# Patient Record
Sex: MALE | Race: BLACK OR AFRICAN AMERICAN | NOT HISPANIC OR LATINO | Employment: UNEMPLOYED | URBAN - METROPOLITAN AREA
[De-identification: names, ages, dates, MRNs, and addresses within clinical notes are randomized per-mention and may not be internally consistent; named-entity substitution may affect disease eponyms.]

---

## 2017-10-22 ENCOUNTER — HOSPITAL ENCOUNTER (INPATIENT)
Facility: HOSPITAL | Age: 22
LOS: 7 days | Discharge: HOME | DRG: 885 | End: 2017-10-29
Attending: PSYCHIATRY & NEUROLOGY | Admitting: PSYCHIATRY & NEUROLOGY
Payer: MEDICAID

## 2017-10-22 DIAGNOSIS — F25.0 SCHIZOAFFECTIVE DISORDER, BIPOLAR TYPE: Primary | ICD-10-CM

## 2017-10-22 DIAGNOSIS — F29 PSYCHOSIS: ICD-10-CM

## 2017-10-22 PROCEDURE — 25000003 PHARM REV CODE 250: Performed by: PSYCHIATRY & NEUROLOGY

## 2017-10-22 PROCEDURE — 11400000 HC PSYCH PRIVATE ROOM

## 2017-10-22 RX ORDER — IBUPROFEN 200 MG
1 TABLET ORAL DAILY PRN
Status: DISCONTINUED | OUTPATIENT
Start: 2017-10-22 | End: 2017-10-29 | Stop reason: HOSPADM

## 2017-10-22 RX ORDER — OLANZAPINE 10 MG/2ML
10 INJECTION, POWDER, FOR SOLUTION INTRAMUSCULAR EVERY 4 HOURS PRN
Status: DISCONTINUED | OUTPATIENT
Start: 2017-10-22 | End: 2017-10-29 | Stop reason: HOSPADM

## 2017-10-22 RX ORDER — ACETAMINOPHEN 325 MG/1
650 TABLET ORAL EVERY 6 HOURS PRN
Status: DISCONTINUED | OUTPATIENT
Start: 2017-10-22 | End: 2017-10-29 | Stop reason: HOSPADM

## 2017-10-22 RX ORDER — FOLIC ACID 1 MG/1
1 TABLET ORAL DAILY
Status: DISCONTINUED | OUTPATIENT
Start: 2017-10-23 | End: 2017-10-29 | Stop reason: HOSPADM

## 2017-10-22 RX ORDER — LOPERAMIDE HYDROCHLORIDE 2 MG/1
2 CAPSULE ORAL
Status: DISCONTINUED | OUTPATIENT
Start: 2017-10-22 | End: 2017-10-29 | Stop reason: HOSPADM

## 2017-10-22 RX ORDER — OLANZAPINE 10 MG/1
10 TABLET ORAL EVERY 4 HOURS PRN
Status: DISCONTINUED | OUTPATIENT
Start: 2017-10-22 | End: 2017-10-29 | Stop reason: HOSPADM

## 2017-10-22 RX ORDER — HYDROXYZINE PAMOATE 50 MG/1
50 CAPSULE ORAL NIGHTLY PRN
Status: DISCONTINUED | OUTPATIENT
Start: 2017-10-22 | End: 2017-10-29 | Stop reason: HOSPADM

## 2017-10-22 RX ORDER — MAG HYDROX/ALUMINUM HYD/SIMETH 200-200-20
30 SUSPENSION, ORAL (FINAL DOSE FORM) ORAL EVERY 6 HOURS PRN
Status: DISCONTINUED | OUTPATIENT
Start: 2017-10-22 | End: 2017-10-29 | Stop reason: HOSPADM

## 2017-10-22 RX ORDER — DOCUSATE SODIUM 100 MG/1
100 CAPSULE, LIQUID FILLED ORAL DAILY PRN
Status: DISCONTINUED | OUTPATIENT
Start: 2017-10-22 | End: 2017-10-29 | Stop reason: HOSPADM

## 2017-10-22 RX ADMIN — HYDROXYZINE PAMOATE 50 MG: 50 CAPSULE ORAL at 11:10

## 2017-10-22 NOTE — PSYCH
Pt accepted for admission by Dr Michelle.Report received from Ayesha OWEN.Pt arrived on unit at 1410.Routine body assessment/contraband search performed.No contraband found.Pt brought in to ER last night by police on a OPC.Pt mom placed the OPC on pt.Pt denies this person is his mom.OPC states pt has been acting bizarre and made threat to kill his mom and step father.Pt reports he does not know why he is here.Pt admits he has been admitted at least twice before at [facility].Pt states he was told he was schizophrenic but he states he is not.Pt reports hx of being put on Risperdal but he refuses to take it because he does not need it.Pt admits to seeing ghost of dead people''alot''.Pt distracted during interview and questions needed to be repeated frequently.Pt focused on getting out and guarded.Pt given unit tour and educated on program and rules.Pt anxious and distracted and pacing at times.

## 2017-10-23 LAB
CHOLEST SERPL-MCNC: 142 MG/DL
CHOLEST/HDLC SERPL: 3.8 {RATIO}
GLUCOSE SERPL-MCNC: 90 MG/DL
HDLC SERPL-MCNC: 37 MG/DL
HDLC SERPL: 26.1 %
LDLC SERPL CALC-MCNC: 92.8 MG/DL
NONHDLC SERPL-MCNC: 105 MG/DL
TRIGL SERPL-MCNC: 61 MG/DL

## 2017-10-23 PROCEDURE — 80061 LIPID PANEL: CPT

## 2017-10-23 PROCEDURE — 25000003 PHARM REV CODE 250: Performed by: PSYCHIATRY & NEUROLOGY

## 2017-10-23 PROCEDURE — 99223 1ST HOSP IP/OBS HIGH 75: CPT | Mod: ,,, | Performed by: PSYCHIATRY & NEUROLOGY

## 2017-10-23 PROCEDURE — 11400000 HC PSYCH PRIVATE ROOM

## 2017-10-23 PROCEDURE — 82947 ASSAY GLUCOSE BLOOD QUANT: CPT

## 2017-10-23 PROCEDURE — 36415 COLL VENOUS BLD VENIPUNCTURE: CPT

## 2017-10-23 PROCEDURE — 99232 SBSQ HOSP IP/OBS MODERATE 35: CPT | Mod: ,,, | Performed by: FAMILY MEDICINE

## 2017-10-23 RX ORDER — OLANZAPINE 10 MG/1
10 TABLET, ORALLY DISINTEGRATING ORAL ONCE
Status: COMPLETED | OUTPATIENT
Start: 2017-10-23 | End: 2017-10-23

## 2017-10-23 RX ORDER — DIAZEPAM 10 MG/1
10 TABLET ORAL 2 TIMES DAILY
Status: DISCONTINUED | OUTPATIENT
Start: 2017-10-23 | End: 2017-10-25

## 2017-10-23 RX ORDER — OLANZAPINE 10 MG/1
10 TABLET, ORALLY DISINTEGRATING ORAL NIGHTLY
Status: DISCONTINUED | OUTPATIENT
Start: 2017-10-23 | End: 2017-10-25

## 2017-10-23 RX ORDER — OLANZAPINE 5 MG/1
5 TABLET, ORALLY DISINTEGRATING ORAL DAILY
Status: DISCONTINUED | OUTPATIENT
Start: 2017-10-24 | End: 2017-10-29 | Stop reason: HOSPADM

## 2017-10-23 RX ADMIN — DIAZEPAM 10 MG: 10 TABLET ORAL at 08:10

## 2017-10-23 RX ADMIN — OLANZAPINE 10 MG: 10 TABLET, ORALLY DISINTEGRATING ORAL at 08:10

## 2017-10-23 RX ADMIN — DIAZEPAM 10 MG: 10 TABLET ORAL at 09:10

## 2017-10-23 RX ADMIN — HYDROXYZINE PAMOATE 50 MG: 50 CAPSULE ORAL at 09:10

## 2017-10-23 RX ADMIN — FOLIC ACID 1 MG: 1 TABLET ORAL at 08:10

## 2017-10-23 RX ADMIN — THERA TABS 1 TABLET: TAB at 08:10

## 2017-10-23 RX ADMIN — OLANZAPINE 10 MG: 10 TABLET, ORALLY DISINTEGRATING ORAL at 09:10

## 2017-10-23 NOTE — CONSULTS
History & Physical      SUBJECTIVE:     History of Present Illness:  Patient is a 22 y.o. male presents with substance abuse, psychosis and scaring the family. Brought in by OPC.  Admitted to [facility].    No past medical history other than psych. No complaints today.    No prescriptions prior to admission.       Review of patient's allergies indicates:  No Known Allergies    Past Medical History:   Diagnosis Date    History of psychiatric hospitalization     Hx of psychiatric care     Psychiatric problem     Therapy      History reviewed. No pertinent surgical history.  History reviewed. No pertinent family history.  Social History   Substance Use Topics    Smoking status: Current Every Day Smoker     Years: 3.00     Types: Cigarettes    Smokeless tobacco: Never Used    Alcohol use Not on file        Review of Systems:  Constitutional: no fever or chills  Respiratory: no cough or shorness of breath  Cardiovascular: no chest pain or palpitations  Gastrointestinal: no nausea or vomiting, no abdominal pain or change in bowel habits  Musculoskeletal: no arthralgias or myalgias    OBJECTIVE:     Vital Signs (Most Recent)  Temp: 97.9 °F (36.6 °C) (10/23/17 0807)  Pulse: 83 (10/23/17 0807)  Resp: 18 (10/23/17 0807)  BP: 115/75 (10/23/17 0807)    Physical Exam:  General: well developed, well nourished  Lungs:  clear to auscultation bilaterally and normal respiratory effort  Cardiovascular: Heart: regular rate and rhythm, S1, S2 normal, no murmur, click, rub or gallop. Chest Wall: no tenderness. Extremities: no cyanosis or edema, or clubbing. Pulses: 2+ and symmetric.  Abdomen/Rectal: Abdomen: soft, non-tender non-distented; bowel sounds normal; no masses,  no organomegaly. Rectal: not examined  Skin: Skin color, texture, turgor normal. No rashes or lesions  Musculoskeletal:normal gait  Psych:   Neuro: Cranial nerves:  CN II Visual fields full to confrontation.   CN III, IV, VI Pupils are equal, round, and reactive to  light.  CN III: no palsy  Nystagmus: none   Diplopia: none  Ophthalmoparesis: none  CN V Facial sensation intact.   CN VII Facial expression full, symmetric.   CN VIII normal.   CN IX normal.   CN X normal.   CN XI normal.   CN XII normal.      Laboratory  CBC:   Recent Labs  Lab 10/21/17  2252   WBC 9.22   RBC 5.24   HGB 15.8   HCT 45.1      MCV 86   MCH 30.2   MCHC 35.0     CMP:   Recent Labs  Lab 10/21/17  2252      CALCIUM 9.9   ALBUMIN 4.5   PROT 8.3      K 4.3   CO2 18*      BUN 24*   CREATININE 1.2   ALKPHOS 88   ALT 26   AST 65*   BILITOT 0.6       Recent Labs  Lab 10/21/17  2310   COLORU Yellow   SPECGRAV 1.025   PHUR 6.0   PROTEINUA Negative   NITRITE Negative   LEUKOCYTESUR Negative   UROBILINOGEN Negative     TSH   Date Value Ref Range Status   10/21/2017 0.365 (L) 0.400 - 4.000 uIU/mL Final     No results found for this or any previous visit.  Alcohol, Medical, Serum   Date Value Ref Range Status   10/21/2017 <10 <10 mg/dL Final     Acetaminophen (Tylenol), Serum   Date Value Ref Range Status   10/21/2017 <3.0 (L) 10.0 - 20.0 ug/mL Final     Comment:     Toxic Levels:  Adults (4 hr post-ingestion).........>150 ug/mL  Adults (12 hr post-ingestion)........>40 ug/mL  Peds (2 hr post-ingestion, liquid)...>225 ug/mL       No results found for this or any previous visit.  Results for orders placed or performed during the hospital encounter of 10/21/17   Drug screen panel, emergency   Result Value Ref Range    Benzodiazepines Negative     Methadone metabolites Negative     Cocaine (Metab.) Presumptive Positive     Opiate Scrn, Ur Negative     Barbiturate Screen, Ur Negative     Amphetamine Screen, Ur Presumptive Positive     THC Presumptive Positive     Phencyclidine Negative     Creatinine, Random Ur 215.3 23.0 - 375.0 mg/dL    Toxicology Information SEE COMMENT      No results found for: PREGTESTUR    ASSESSMENT/PLAN:     Active Hospital Problems    Diagnosis  POA    Psychosis  [F29]  Yes      Resolved Hospital Problems    Diagnosis Date Resolved POA   No resolved problems to display.       Plan: Further orders for psych
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
## 2017-10-23 NOTE — NURSING
Patient resting quietly in bed with eyes closed.  Respirations easy and unlabored appears asleep.  Slept well for 8 hours.  Safety maintained with rounds every 15 minutes. Bed at lowest position.  Path ways kept clear.  No fall occured .

## 2017-10-23 NOTE — PLAN OF CARE
Problem: Patient Care Overview (Adult)  Goal: Plan of Care Review  Outcome: Ongoing (interventions implemented as appropriate)  Plan of care reviewed with patient, patient not interested in care plan.  Paces on unit, mumbles often. Minimizes substance abuse issues. Accepted Valium and Olanzepine Zydus for agitation earlier, slept for one hour afterwards.  Accepts meals and snacks.  Clutter-free environment and clear pathways maintained.  Promoted an individualized safety plan, effective coping strategies, a drug-free lifestyle, and and reality-based interactions.  Will continue to monitor for safety.

## 2017-10-23 NOTE — PLAN OF CARE
"  Treatment Recommendation:   1:1 Intervention (as needed)    Reality Orientation Skilled Activity  Cognitive Stimulation Skilled Activity  Creative Expression Skilled Activity  Mild Exercises Skilled Activity  Stress Management Skilled Activity  Coping Skilled Activity  Leisure Education and Awareness Skilled Activity    Treatment Goal(s):  Long Term Goals Refer To Master Treatment Plan    Short Term Treatment Goal(s)  Patient Will:  Exhibit Improvement in Mood  Demonstrate Improvement in Thought Processes / Awareness  Demonstrate Constructive Expression of Feelings and Behavior  Identify (+) Leisure / Recreation Activities that Promote Wellness and Promote a Sober Lifestyle    Discharge Recommendations:  Encourage Patient to Actively Utilize Available Community Resources to Increase Leisure Involvement to Decrease Signs and Symptoms of Illness  AA/NA Meetings  Follow Up with After Care Appointments  Continue with Current Leisure Activities     Patient presents with agitated, rambling affect and angry mood. Patient was OPC due to psychotic behavior. Patient states his reason for admit is due to "I was raised by someone with a weird sick obsession. Ain't nothing wrong with me. Somebody called the police and told a lie. I don't need no shots or nothing." Patient is upset about being in the hospital. He states his nerves are bad and he would be walking around. Patient was observed pulling on the doors trying to escape and hanging around the doors. Patient admits to negative leisure life style of cocaine, marijuana, tobacco, history of heroin, crystal meth, ecstasy. Patient reports that he is single, has no children, is unemployed, high school education, lived with his mother before admit(can't go back to his mother). When asked about a goal, patient states "I just want to get out of here."   "
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
## 2017-10-23 NOTE — PLAN OF CARE
Problem: Overarching Goals (Adult)  Goal: Develops/Participates in Therapeutic [place] to Support Successful Transition  Patient refused to attend Psychotherapy Group despite staff encouragement. Patient isolating in room in bed. Will continue to encourage patient participation in group. Will meet  1:1 with patient later

## 2017-10-23 NOTE — NURSING
Patient quietly resting in his room this evening.  He walked to activity room for PM snack, and went right back to his room and layed down.  At 2300, patient came out of his room stating that he needed something to help him go back to sleep because he slept most of the day.  Vistaril  50mg given for insomnia.  He denies depression, SI and HI or plan.  He is calm and cooperative with staff.

## 2017-10-24 PROCEDURE — 11400000 HC PSYCH PRIVATE ROOM

## 2017-10-24 PROCEDURE — 99233 SBSQ HOSP IP/OBS HIGH 50: CPT | Mod: ,,, | Performed by: PSYCHIATRY & NEUROLOGY

## 2017-10-24 PROCEDURE — 25000003 PHARM REV CODE 250: Performed by: PSYCHIATRY & NEUROLOGY

## 2017-10-24 RX ADMIN — THERA TABS 1 TABLET: TAB at 08:10

## 2017-10-24 RX ADMIN — DIAZEPAM 10 MG: 10 TABLET ORAL at 08:10

## 2017-10-24 RX ADMIN — OLANZAPINE 5 MG: 5 TABLET, ORALLY DISINTEGRATING ORAL at 08:10

## 2017-10-24 RX ADMIN — HYDROXYZINE PAMOATE 50 MG: 50 CAPSULE ORAL at 05:10

## 2017-10-24 RX ADMIN — FOLIC ACID 1 MG: 1 TABLET ORAL at 08:10

## 2017-10-24 RX ADMIN — OLANZAPINE 10 MG: 10 TABLET, ORALLY DISINTEGRATING ORAL at 08:10

## 2017-10-24 NOTE — PLAN OF CARE
Problem: Patient Care Overview (Adult)  Goal: Plan of Care Review  Outcome: Ongoing (interventions implemented as appropriate)  Plan of care reviewed with patient, patient agrees with plan. Denies suicidal ideations and homicidal ideations.  Accepts meals and snacks, complaint with some of his ordered medications.  Paces on unit, gait steady, no falls.  Interacts with other peers.  Promoted an individualized safety plan, effective coping strategies, a drug-free lifestyle, and reality-based interactions.  Will continue to monitor for safety.
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
## 2017-10-24 NOTE — PLAN OF CARE
Problem: Patient Care Overview (Adult)  Goal: Plan of Care Review  Outcome: Ongoing (interventions implemented as appropriate)  Pt demanding, intrusive, med seeking, med compliant, appetite good, safety precautions maitained, will continue to monitor

## 2017-10-24 NOTE — PLAN OF CARE
Problem: Patient Care Overview (Adult)  Goal: Discharge Needs Assessment  Patient [facility] shelter [facility]. Patient is not interested in following up with a mental health but will have an appointment. Patient will need transportation.

## 2017-10-24 NOTE — PLAN OF CARE
Problem: Overarching Goals (Adult)  Goal: Develops/Participates in Therapeutic [place] to Support Successful Transition  Patient did not attend Psychotherapy Group. Patient went to bed and did not get up for group. Will continue to encourage patient participation in group.

## 2017-10-24 NOTE — PLAN OF CARE
Problem: Patient Care Overview (Adult)  Goal: Plan of Care Review  Outcome: Ongoing (interventions implemented as appropriate)  Pt is awake resting in bed at this time and has slept 8.5 hours uninterupted.  Had got out room around 0430 walking hallway.  No outbursts, no needs voiced.  NAD.  Resp even & unlabored.  Pathways clear.  Q 15 minute safety checks ongoing.  All precautions maintained

## 2017-10-25 PROCEDURE — 11400000 HC PSYCH PRIVATE ROOM

## 2017-10-25 PROCEDURE — 99233 SBSQ HOSP IP/OBS HIGH 50: CPT | Mod: ,,, | Performed by: PSYCHIATRY & NEUROLOGY

## 2017-10-25 PROCEDURE — 25000003 PHARM REV CODE 250: Performed by: PSYCHIATRY & NEUROLOGY

## 2017-10-25 RX ORDER — DIVALPROEX SODIUM 500 MG/1
1000 TABLET, FILM COATED, EXTENDED RELEASE ORAL NIGHTLY
Status: DISCONTINUED | OUTPATIENT
Start: 2017-10-25 | End: 2017-10-29 | Stop reason: HOSPADM

## 2017-10-25 RX ORDER — DIVALPROEX SODIUM 500 MG/1
500 TABLET, FILM COATED, EXTENDED RELEASE ORAL DAILY
Status: DISCONTINUED | OUTPATIENT
Start: 2017-10-25 | End: 2017-10-29 | Stop reason: HOSPADM

## 2017-10-25 RX ADMIN — OLANZAPINE 5 MG: 5 TABLET, ORALLY DISINTEGRATING ORAL at 08:10

## 2017-10-25 RX ADMIN — FOLIC ACID 1 MG: 1 TABLET ORAL at 08:10

## 2017-10-25 RX ADMIN — DIVALPROEX SODIUM 1000 MG: 500 TABLET, EXTENDED RELEASE ORAL at 08:10

## 2017-10-25 RX ADMIN — DIAZEPAM 10 MG: 10 TABLET ORAL at 08:10

## 2017-10-25 RX ADMIN — DIVALPROEX SODIUM 500 MG: 500 TABLET, EXTENDED RELEASE ORAL at 09:10

## 2017-10-25 RX ADMIN — OLANZAPINE 15 MG: 5 TABLET, ORALLY DISINTEGRATING ORAL at 08:10

## 2017-10-25 RX ADMIN — THERA TABS 1 TABLET: TAB at 08:10

## 2017-10-25 RX ADMIN — OLANZAPINE 10 MG: 10 TABLET, FILM COATED ORAL at 10:10

## 2017-10-25 RX ADMIN — HYDROXYZINE PAMOATE 50 MG: 50 CAPSULE ORAL at 08:10

## 2017-10-25 NOTE — PLAN OF CARE
Problem: Overarching Goals (Adult)  Goal: Develops/Participates in Therapeutic [place] to Support Successful Transition      Group Note    Behavior:  Patient attended psychotherapy group but did not stay and did not participate. Patient presents with irritable mood and guarded affect.     Intervention:  Knowing Your relapse warning signs and trigger - Patients completed handout identifying and recognizing early signs of setbacks and identifying triggers. Patients then discussed actions to take to reduce setbacks.     Response:  Patient wandered in and out of the session, but did not participate in the discussion or even use the handout.     Plan:  Will continue to encourage patient participation in group.

## 2017-10-25 NOTE — PLAN OF CARE
Problem: Patient Care Overview (Adult)  Goal: Plan of Care Review  Outcome: Ongoing (interventions implemented as appropriate)  Pt loud angry,agitated,and making threats to attack the doctor.Pt disruptive to milieu.Pt does not want to take medication and wants discharge.Pt did agree to take prn Zyprexa 10mg after much encouragement.Pt with constant pacing in coates.
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
## 2017-10-25 NOTE — PLAN OF CARE
Problem: Patient Care Overview (Adult)  Goal: Plan of Care Review  Outcome: Ongoing (interventions implemented as appropriate)  Plan of care reviewed.  Denies intent to harm self or others at this time.  Accepts all meals and medications.  Gait steady, no falls. Pacing in coates and will interact with staff and peers but can be intrusive but easily redirected.  Promoted an individualized safety plan, reality-based interactions, effective coping strategies, and impulse control.  Will continue to monitor for safety.

## 2017-10-25 NOTE — PLAN OF CARE
Problem: Patient Care Overview (Adult)  Goal: Plan of Care Review  Outcome: Ongoing (interventions implemented as appropriate)  Lying quiet in bed, eyes closed, respiration even and unlabored, appearing asleep.  Slept well most all shift except a couple of awakenings at the end.  Currently lying quiet in bed but adjusting his position.  Safety and precautions maintained with rounds every 15 minutes, bed is fixed in a low position and pathways kept clear.  No fall occurred.

## 2017-10-26 PROBLEM — F25.0 SCHIZOAFFECTIVE DISORDER, BIPOLAR TYPE: Status: ACTIVE | Noted: 2017-10-22

## 2017-10-26 PROBLEM — F14.20 COCAINE USE DISORDER, SEVERE, DEPENDENCE: Status: ACTIVE | Noted: 2017-10-26

## 2017-10-26 PROBLEM — F14.90 COCAINE USE: Status: ACTIVE | Noted: 2017-10-26

## 2017-10-26 PROBLEM — F15.20 AMPHETAMINE USE DISORDER, SEVERE, DEPENDENCE: Status: ACTIVE | Noted: 2017-10-26

## 2017-10-26 PROCEDURE — 99233 SBSQ HOSP IP/OBS HIGH 50: CPT | Mod: ,,, | Performed by: PSYCHIATRY & NEUROLOGY

## 2017-10-26 PROCEDURE — 25000003 PHARM REV CODE 250: Performed by: PSYCHIATRY & NEUROLOGY

## 2017-10-26 PROCEDURE — 11400000 HC PSYCH PRIVATE ROOM

## 2017-10-26 RX ADMIN — OLANZAPINE 15 MG: 5 TABLET, ORALLY DISINTEGRATING ORAL at 08:10

## 2017-10-26 RX ADMIN — FOLIC ACID 1 MG: 1 TABLET ORAL at 08:10

## 2017-10-26 RX ADMIN — THERA TABS 1 TABLET: TAB at 08:10

## 2017-10-26 RX ADMIN — OLANZAPINE 5 MG: 5 TABLET, ORALLY DISINTEGRATING ORAL at 08:10

## 2017-10-26 RX ADMIN — DIVALPROEX SODIUM 1000 MG: 500 TABLET, EXTENDED RELEASE ORAL at 08:10

## 2017-10-26 RX ADMIN — DIVALPROEX SODIUM 500 MG: 500 TABLET, EXTENDED RELEASE ORAL at 08:10

## 2017-10-26 NOTE — PLAN OF CARE
Problem: Patient Care Overview (Adult)  Goal: Plan of Care Review  Outcome: Ongoing (interventions implemented as appropriate)  Pt out on unit.Pt constantly pacing halls.Appitite good and grooming adequate.Pt with no angry outbursts.Talking in normal tone with less foul language.Medication compliant.

## 2017-10-26 NOTE — PLAN OF CARE
Problem: Overarching Goals (Adult)  Goal: Develops/Participates in Therapeutic [place] to Support Successful Transition    Intervention: Foster Therapeutic [place]  Group Note    Behavior:  Patient attended Psychotherapy Group and participated. Patient presented with guarded mood and affect.     Intervention:  Understanding and Coping with Stress - shared definitions of stress, good stress vs unhealthy, recognizing changes in your body, thinking, emotions and actions. Reviewed 52 Proven Stress Reducers. Patients chose 5 they could do and one they thought they could not do and discussed.     Response:  Patient was quiet at first, but attentive and shared his choices. Patient states yoga, aoiding people and places that don't fit his needs, learning to live one day at a time, and having an optimistic point of view are stress reducers he can do. Patient noted that forgiving people would be hard for him. Patient complained that he is here based on someone else's word and he is trying to make the doctor understand that. Discussed self care and learning to let go.       Plan:  Will continue to encourage patient participation in group.
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
## 2017-10-27 PROCEDURE — 99233 SBSQ HOSP IP/OBS HIGH 50: CPT | Mod: ,,, | Performed by: PSYCHIATRY & NEUROLOGY

## 2017-10-27 PROCEDURE — 25000003 PHARM REV CODE 250: Performed by: PSYCHIATRY & NEUROLOGY

## 2017-10-27 PROCEDURE — 11400000 HC PSYCH PRIVATE ROOM

## 2017-10-27 RX ADMIN — FOLIC ACID 1 MG: 1 TABLET ORAL at 08:10

## 2017-10-27 RX ADMIN — OLANZAPINE 5 MG: 5 TABLET, ORALLY DISINTEGRATING ORAL at 08:10

## 2017-10-27 RX ADMIN — DIVALPROEX SODIUM 500 MG: 500 TABLET, EXTENDED RELEASE ORAL at 08:10

## 2017-10-27 RX ADMIN — OLANZAPINE 15 MG: 5 TABLET, ORALLY DISINTEGRATING ORAL at 08:10

## 2017-10-27 RX ADMIN — DIVALPROEX SODIUM 1000 MG: 500 TABLET, EXTENDED RELEASE ORAL at 08:10

## 2017-10-27 RX ADMIN — THERA TABS 1 TABLET: TAB at 08:10

## 2017-10-27 NOTE — PLAN OF CARE
Problem: Overarching Goals (Adult)  Goal: Develops/Participates in Therapeutic [place] to Support Successful Transition  Collateral Contact:   Patient refuses to allow any collateral contact.

## 2017-10-27 NOTE — PLAN OF CARE
Problem: Overarching Goals (Adult)  Goal: Develops/Participates in Therapeutic [place] to Support Successful Transition    Intervention: Foster Therapeutic [place]  Patient did not attend Psychotherapy Group. Patient sleeping in room. Will continue to encourage patient participation in group. Will meet 1:1 with patient later.

## 2017-10-27 NOTE — PLAN OF CARE
Problem: Overarching Goals (Adult)  Goal: Develops/Participates in Therapeutic [place] to Support Successful Transition  Collateral Contact:  Patient refuses to allow collateral contact

## 2017-10-27 NOTE — NURSING
Pt is awake at the present time and slept 9 so far.   NAD.  Resp even & unlabored.  Pathways clear and bed in low position.  Safety checks ongoing.  All precautions maintained.
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
## 2017-10-27 NOTE — PROGRESS NOTES
"PSYCHIATRY DAILY INPATIENT PROGRESS NOTE  SUBSEQUENT HOSPITAL VISIT    ENCOUNTER DATE: 10/27/2017  SITE: [facility]    DATE OF ADMISSION: 10/22/2017  2:16 PM  LENGTH OF STAY: 5 days      HISTORY    CHIEF COMPLAINT   Lv Jacob is a 22 y.o. male, seen during daily freeman rounds on the inpatient unit.  Lv Jacob presents with the chief complaint of OPC for psychotic behavior    HPI   (Elements: Location, Quality, Severity, Duration, Timing, Content, Modifying Factors, Associated Signs & Symptoms)    The patient was seen and examined. The chart was reviewed.    Staff reports no behavioral or management issues.     The patient has been compliant with treatment. The patient denied any side effects.    Patient is much improved today.  He is polite, apologetic of behavior previously, and future oriented toward discharge.  He plans on being discharged to a shelter, waiting for his court date in [facility] to be up November 8th, and then turning himself in to shelter in order to serve time for his fine.  He explains that his life is in "shambles" because of decisions that he has made.  He continues to not have insight into illness but is less aggressive, not demonstrating psychotic or manic symptoms as much as previously.      Denies Symptoms of Depression: diminished mood or loss of interest/anhedonia; irritability, diminished energy, change in sleep, change in appetite, diminished concentration or cognition or indecisiveness, PMA/R, excessive guilt or hopelessness or worthlessness, suicidal ideations      Sleep: initiation, maintenance, early morning awakening with inability to return to sleep     Slept 9 hours last night, says he is sleeping well     Denies Suicidal/Homicidal ideations: active/passive ideations, organized plans, future intentions     Patient without homicidal or suicidal ideation.  He says he would like to get away from his mothers neighborhood and start somewhere new such as [facility] [facility]       Denies Symptoms of psychosis: no apparent hallucinations, less delusional thought content, nodisorganized thinking, no disorganized behavior or abnormal motor behavior, or negative symptoms (diminshed emotional expression, avolition, anhedonia, alogia, asociality     Patient continues to have angry thoughts about his Mom but no delusional statements made    Improved, no apparent Symptoms of ivan or hypomania: elevated, expansive, or irritable mood with increased energy or activity; with inflated self-esteem or grandiosity, decreased need for sleep, increased rate of speech, FOI or racing thoughts, distractibility, increased goal directed activity or PMA, risky/disinhibited behavior    Patient with much less grandiosity     Denies Symptoms of DEL: excessive anxiety/worry/fear, more days than not, about numerous issues, difficult to control, with restlessness, fatigue, poor concentration, irritability, muscle tension, sleep disturbance; causes functionally impairing distress   Denied     Denies Symptoms of Panic Disorder: recurrent panic attacks, precipitated or un-precipitated, source of worry and/or behavioral changes secondary; with or without agoraphobia  Denied    ROS  General ROS: negative  Ophthalmic ROS: negative  ENT ROS: negative  Allergy and Immunology ROS: negative  Hematological and Lymphatic ROS: negative  Endocrine ROS: negative  Respiratory ROS: no cough, shortness of breath, or wheezing  Cardiovascular ROS: no chest pain or dyspnea on exertion  Gastrointestinal ROS: no abdominal pain, change in bowel habits, or black or bloody stools  Genito-Urinary ROS: no dysuria, trouble voiding, or hematuria  Musculoskeletal ROS: negative  Neurological ROS: no TIA or stroke symptoms  Dermatological ROS: negative    PAST MEDICAL HISTORY   Past Medical History:   Diagnosis Date    History of psychiatric hospitalization     Hx of psychiatric care     Psychiatric problem     Therapy   "          PSYCHOTROPIC MEDICATIONS   Scheduled Meds:   divalproex ER  1,000 mg Oral QHS    divalproex ER  500 mg Oral Daily    folic acid  1 mg Oral Daily    multivitamin  1 tablet Oral Daily    olanzapine zydis  15 mg Oral QHS    olanzapine zydis  5 mg Oral Daily     Continuous Infusions:   PRN Meds:.acetaminophen, aluminum-magnesium hydroxide-simethicone, docusate sodium, hydrOXYzine pamoate, loperamide, nicotine, OLANZapine **AND** OLANZapine        EXAMINATION    VITALS   Vitals:    10/27/17 0730   BP: (!) 158/78   Pulse: 72   Resp: 18   Temp: 97.3 °F (36.3 °C)       CONSTITUTIONAL  General Appearance: NAD     MUSCULOSKELETAL  Muscle Strength and Tone:  normal  Abnormal Involuntary Movements:  none  Gait and Station:  normal; non-ataxic   PSYCHIATRIC   Level of Consciousness: awake, alert  Orientation: p/p/t/s  Grooming:  adequate to circumstances  Psychomotor Behavior:  improved PMA/R  Speech: regular rhythm and rate  Language: able to repeat words  Mood: "good"  Affect: less aggressive, pleasant, able to talk without getting upset  Thought Process: linear and organized  Associations:  intact; improved no KARLEY  Thought Content: denies SI HI AVH  Memory:  intact to recent and remote events  Attention:  intact to conversation; not distractible   Fund of Knowledge:  likely average  Estimate if Intelligence: likely average   Insight: fair - somewhat improving recognizing and acknowledging the difficulty he is having  Judgment:  Improving - somewhat cooperative,         DIAGNOSTIC TESTING   Laboratory Results  No results found for this or any previous visit (from the past 24 hour(s)).      MEDICAL DECISION MAKING    DIAGNOSES  Schizoaffective Disorder Bipolar Type  Polysubstance Dependence including Cocaine Use, Marijuana Use, Methamphetamine Use, Ecstasy Use  Nicotine Dependence  Cluster B Personality    PROBLEM LIST AND MANAGEMENT PLANS    Psychosis- Olanzapine, redirection / counseling  Polysubstance Use - " will give Valium taper  Nicotine Dependence - replacement and counseling for cessation  Personality Disorder - counseling, possible outpatient counseling    PRESCRIPTION DRUG MANAGEMENT  Compliance: yes  Side Effects: no  Regimen Adjustments:      10/23/17  Start Olanzapine 5mg QDay and 10mg QHS  Start Valium 10mg BID    10/25  Start Depakote 500mg QDay and 1000mg QHS    10/26  Discontinue valium as it has been disinhibiting  Increase Zyprexa 5mg QAM and 15mg QHS    10/27  Will get depakote level for tomorrow morning    DISCHARGE PLANNING  -SW to assist with aftercare planning and collateral  -Once stable discharge home with outpatient follow up care and/or rehab  -Continue inpatient treatment under a PEC and/or CEC for danger to self, and danger to others, and grave disability as evident by OPC with voiced suicidal / homicidal ideation in the setting of psychosis and severe substance use      NEED FOR CONTINUED HOSPITALIZATION  Psychiatric illness continues to pose a potential threat to life or bodily function, of self or others, thereby requiring the need for continued inpatient psychiatric hospitalization: Yes    Protective inpatient pyschiatric hospitalization required while a safe disposition plan is enacted: Yes    Patient stabilized and ready for discharge from inpatient psychiatric unit: No      STAFF:   Joshua Michelle MD  Psychiatry

## 2017-10-27 NOTE — PLAN OF CARE
Problem: Patient Care Overview (Adult)  Goal: Plan of Care Review  Outcome: Ongoing (interventions implemented as appropriate)  Plan of care reviewed with patient, patient agrees with plan.  Denies suicidal ideations and homicidal ideations.  Focused on being discharged from unit and going to a shelter or to a mission.  Compliant with medications, accepts meals and snacks.  Gait steady, no falls. Clutter-free environment and clear pathways maintained.  Intrusive at times, frequently paces on unit.  Promoted an individualized safety plan, effective coping strategies, reality-based interactions, and impulse control.  Will continue to monitor for safety.

## 2017-10-27 NOTE — PLAN OF CARE
Problem: Patient Care Overview (Adult)  Goal: Plan of Care Review  Outcome: Ongoing (interventions implemented as appropriate)  Pt out on unit pacing halls.Sleeping good and apitite adequate.No angry outbursts.Speaking in normal tone and no foul language.No threats.Taking medications with no hesitation.Mood improved.

## 2017-10-28 LAB
ALBUMIN SERPL BCP-MCNC: 3.8 G/DL
ALP SERPL-CCNC: 69 U/L
ALT SERPL W/O P-5'-P-CCNC: 32 U/L
ANION GAP SERPL CALC-SCNC: 8 MMOL/L
AST SERPL-CCNC: 19 U/L
BILIRUB SERPL-MCNC: 0.2 MG/DL
BUN SERPL-MCNC: 13 MG/DL
CALCIUM SERPL-MCNC: 9.5 MG/DL
CHLORIDE SERPL-SCNC: 105 MMOL/L
CO2 SERPL-SCNC: 26 MMOL/L
CREAT SERPL-MCNC: 0.9 MG/DL
EST. GFR  (AFRICAN AMERICAN): >60 ML/MIN/1.73 M^2
EST. GFR  (NON AFRICAN AMERICAN): >60 ML/MIN/1.73 M^2
GLUCOSE SERPL-MCNC: 90 MG/DL
POTASSIUM SERPL-SCNC: 4.3 MMOL/L
PROT SERPL-MCNC: 7.1 G/DL
SODIUM SERPL-SCNC: 139 MMOL/L
VALPROATE SERPL-MCNC: 73.7 UG/ML

## 2017-10-28 PROCEDURE — 11400000 HC PSYCH PRIVATE ROOM

## 2017-10-28 PROCEDURE — 36415 COLL VENOUS BLD VENIPUNCTURE: CPT

## 2017-10-28 PROCEDURE — 25000003 PHARM REV CODE 250: Performed by: PSYCHIATRY & NEUROLOGY

## 2017-10-28 PROCEDURE — 99233 SBSQ HOSP IP/OBS HIGH 50: CPT | Mod: ,,, | Performed by: PSYCHIATRY & NEUROLOGY

## 2017-10-28 PROCEDURE — 80053 COMPREHEN METABOLIC PANEL: CPT

## 2017-10-28 PROCEDURE — 80164 ASSAY DIPROPYLACETIC ACD TOT: CPT

## 2017-10-28 RX ADMIN — OLANZAPINE 5 MG: 5 TABLET, ORALLY DISINTEGRATING ORAL at 09:10

## 2017-10-28 RX ADMIN — DIVALPROEX SODIUM 1000 MG: 500 TABLET, EXTENDED RELEASE ORAL at 08:10

## 2017-10-28 RX ADMIN — THERA TABS 1 TABLET: TAB at 09:10

## 2017-10-28 RX ADMIN — HYDROXYZINE PAMOATE 50 MG: 50 CAPSULE ORAL at 06:10

## 2017-10-28 RX ADMIN — DIVALPROEX SODIUM 500 MG: 500 TABLET, EXTENDED RELEASE ORAL at 09:10

## 2017-10-28 RX ADMIN — OLANZAPINE 15 MG: 5 TABLET, ORALLY DISINTEGRATING ORAL at 08:10

## 2017-10-28 RX ADMIN — FOLIC ACID 1 MG: 1 TABLET ORAL at 09:10

## 2017-10-28 NOTE — PLAN OF CARE
Problem: Patient Care Overview (Adult)  Goal: Plan of Care Review  Outcome: Ongoing (interventions implemented as appropriate)  Plan of care reviewed with patient, patient agrees with plan.  Denies suicidal ideations and homicidal ideations.  Gait steady, no falls.  Clutter-free environment and clear pathways maintained.  Compliant with medication, accepts meals and snacks.  No intrusiveness observed.  Promoted an individualized safety plan, effective coping strategies, and reality-based interactions.  Will continue to monitor for safety.

## 2017-10-28 NOTE — PROGRESS NOTES
"PSYCHIATRY DAILY INPATIENT PROGRESS NOTE  SUBSEQUENT HOSPITAL VISIT    ENCOUNTER DATE: 10/28/2017  SITE: [facility]    DATE OF ADMISSION: 10/22/2017  2:16 PM  LENGTH OF STAY: 6 days      HISTORY    CHIEF COMPLAINT   Lv Jacob is a 22 y.o. male, seen during daily freeman rounds on the inpatient unit.  Lv Jacob presents with the chief complaint of OPC for psychotic behavior    HPI   (Elements: Location, Quality, Severity, Duration, Timing, Content, Modifying Factors, Associated Signs & Symptoms)    The patient was seen and examined. The chart was reviewed.    Staff reports no behavioral or management issues.     The patient has been compliant with treatment. The patient denied any side effects.    Patient is much improved today.  He is polite, apologetic of behavior previously, and future oriented toward discharge.  He plans on being discharged to a shelter, waiting for his court date in [facility] to be up November 8th, and then turning himself in to prison in order to serve time for his fine.  He explains that his life is in "shambles" because of decisions that he has made.  He continues to not have insight into illness / substance use but is less aggressive, not demonstrating psychotic or manic symptoms.    Denies Symptoms of Depression: diminished mood or loss of interest/anhedonia; irritability, diminished energy, change in sleep, change in appetite, diminished concentration or cognition or indecisiveness, PMA/R, excessive guilt or hopelessness or worthlessness, suicidal ideations      Sleep: initiation, maintenance, early morning awakening with inability to return to sleep     Slept 8.5 hours last night, says he is sleeping well     Denies Suicidal/Homicidal ideations: active/passive ideations, organized plans, future intentions     Patient without homicidal or suicidal ideation.  He says he would like to get away from his mothers neighborhood and start somewhere new such as [employer] " or [place].       Denies Symptoms of psychosis: no apparent hallucinations, less delusional thought content, nodisorganized thinking, no disorganized behavior or abnormal motor behavior, or negative symptoms (diminshed emotional expression, avolition, anhedonia, alogia, asociality     Patient continues to have angry thoughts about his Mom but no delusional statements made.  He does talk about trauma he experienced with his grandmother but it is hard to say whether this is psychotic in nature    Improved, no apparent Symptoms of ivan or hypomania: elevated, expansive, or irritable mood with increased energy or activity; with inflated self-esteem or grandiosity, decreased need for sleep, increased rate of speech, FOI or racing thoughts, distractibility, increased goal directed activity or PMA, risky/disinhibited behavior    No longer with symptoms of ivan     Denies Symptoms of DEL: excessive anxiety/worry/fear, more days than not, about numerous issues, difficult to control, with restlessness, fatigue, poor concentration, irritability, muscle tension, sleep disturbance; causes functionally impairing distress   Denied     Denies Symptoms of Panic Disorder: recurrent panic attacks, precipitated or un-precipitated, source of worry and/or behavioral changes secondary; with or without agoraphobia  Denied    ROS  General ROS: negative  Ophthalmic ROS: negative  ENT ROS: negative  Allergy and Immunology ROS: negative  Hematological and Lymphatic ROS: negative  Endocrine ROS: negative  Respiratory ROS: no cough, shortness of breath, or wheezing  Cardiovascular ROS: no chest pain or dyspnea on exertion  Gastrointestinal ROS: no abdominal pain, change in bowel habits, or black or bloody stools  Genito-Urinary ROS: no dysuria, trouble voiding, or hematuria  Musculoskeletal ROS: negative  Neurological ROS: no TIA or stroke symptoms  Dermatological ROS: negative    PAST MEDICAL HISTORY   Past Medical History:   Diagnosis  "Date    History of psychiatric hospitalization     Hx of psychiatric care     Psychiatric problem     Therapy            PSYCHOTROPIC MEDICATIONS   Scheduled Meds:   divalproex ER  1,000 mg Oral QHS    divalproex ER  500 mg Oral Daily    folic acid  1 mg Oral Daily    multivitamin  1 tablet Oral Daily    olanzapine zydis  15 mg Oral QHS    olanzapine zydis  5 mg Oral Daily     Continuous Infusions:   PRN Meds:.acetaminophen, aluminum-magnesium hydroxide-simethicone, docusate sodium, hydrOXYzine pamoate, loperamide, nicotine, OLANZapine **AND** OLANZapine        EXAMINATION    VITALS   Vitals:    10/27/17 2000   BP: 128/77   Pulse: 87   Resp: 20   Temp: 98 °F (36.7 °C)       CONSTITUTIONAL  General Appearance: NAD     MUSCULOSKELETAL  Muscle Strength and Tone:  normal  Abnormal Involuntary Movements:  none  Gait and Station:  normal; non-ataxic   PSYCHIATRIC   Level of Consciousness: awake, alert  Orientation: p/p/t/s  Grooming:  adequate to circumstances  Psychomotor Behavior:  improved PMA/R  Speech: regular rhythm and rate  Language: able to repeat words  Mood: "good"  Affect: demonstrates ability to regulate emotion, pleasant cooperative  Thought Process: linear and organized  Associations:  intact; improved no KARLEY  Thought Content: denies SI HI AVH  Memory:  intact to recent and remote events  Attention:  intact to conversation; not distractible   Fund of Knowledge:  likely average  Estimate if Intelligence: likely average   Insight: good - improved recognizing and acknowledging the difficulty he is having, still doesn't see drug use as connected to problems  Judgment:  good - cooperative, not a behavioral issue on unit        DIAGNOSTIC TESTING   Laboratory Results  Recent Results (from the past 24 hour(s))   Comprehensive metabolic panel    Collection Time: 10/28/17  7:32 AM   Result Value Ref Range    Sodium 139 136 - 145 mmol/L    Potassium 4.3 3.5 - 5.1 mmol/L    Chloride 105 95 - 110 mmol/L    " CO2 26 23 - 29 mmol/L    Glucose 90 70 - 110 mg/dL    BUN, Bld 13 6 - 20 mg/dL    Creatinine 0.9 0.5 - 1.4 mg/dL    Calcium 9.5 8.7 - 10.5 mg/dL    Total Protein 7.1 6.0 - 8.4 g/dL    Albumin 3.8 3.5 - 5.2 g/dL    Total Bilirubin 0.2 0.1 - 1.0 mg/dL    Alkaline Phosphatase 69 55 - 135 U/L    AST 19 10 - 40 U/L    ALT 32 10 - 44 U/L    Anion Gap 8 8 - 16 mmol/L    eGFR if African American >60 >60 mL/min/1.73 m^2    eGFR if non African American >60 >60 mL/min/1.73 m^2         MEDICAL DECISION MAKING    DIAGNOSES  Schizoaffective Disorder Bipolar Type  Polysubstance Dependence including Cocaine Use, Marijuana Use, Methamphetamine Use, Ecstasy Use  Nicotine Dependence  Cluster B Personality    PROBLEM LIST AND MANAGEMENT PLANS    Psychosis- Olanzapine, redirection / counseling  Polysubstance Use - will give Valium taper  Nicotine Dependence - replacement and counseling for cessation  Personality Disorder - counseling, possible outpatient counseling    PRESCRIPTION DRUG MANAGEMENT  Compliance: yes  Side Effects: no  Regimen Adjustments:      10/23/17  Start Olanzapine 5mg QDay and 10mg QHS  Start Valium 10mg BID    10/25  Start Depakote 500mg QDay and 1000mg QHS    10/26  Discontinue valium as it has been disinhibiting  Increase Zyprexa 5mg QAM and 15mg QHS    10/27  Will get depakote level for tomorrow morning    DISCHARGE PLANNING  -SW to assist with aftercare planning and collateral  -Once stable discharge home with outpatient follow up care and/or rehab  -Continue inpatient treatment under a [facility] and/or [facility] for danger to self, and danger to others, and grave disability as evident by OPC with voiced suicidal / homicidal ideation in the setting of psychosis and severe substance use      NEED FOR CONTINUED HOSPITALIZATION  Psychiatric illness continues to pose a potential threat to life or bodily function, of self or others, thereby requiring the need for continued inpatient psychiatric hospitalization: Yes    Protective  inpatient pyschiatric hospitalization required while a safe disposition plan is enacted: Yes    Patient stabilized and ready for discharge from inpatient psychiatric unit: No      STAFF:   Joshua Michelle MD  Psychiatry

## 2017-10-29 VITALS
TEMPERATURE: 97 F | SYSTOLIC BLOOD PRESSURE: 118 MMHG | HEIGHT: 61 IN | WEIGHT: 164 LBS | HEART RATE: 71 BPM | RESPIRATION RATE: 18 BRPM | DIASTOLIC BLOOD PRESSURE: 57 MMHG | BODY MASS INDEX: 30.96 KG/M2

## 2017-10-29 PROCEDURE — 99239 HOSP IP/OBS DSCHRG MGMT >30: CPT | Mod: ,,, | Performed by: PSYCHIATRY & NEUROLOGY

## 2017-10-29 PROCEDURE — 25000003 PHARM REV CODE 250: Performed by: PSYCHIATRY & NEUROLOGY

## 2017-10-29 RX ORDER — OLANZAPINE 5 MG/1
5 TABLET ORAL DAILY
Qty: 30 TABLET | Refills: 0 | Status: SHIPPED | OUTPATIENT
Start: 2017-10-29 | End: 2018-10-29

## 2017-10-29 RX ORDER — DIVALPROEX SODIUM 500 MG/1
500 TABLET, FILM COATED, EXTENDED RELEASE ORAL DAILY
Qty: 60 TABLET | Refills: 0 | Status: SHIPPED | OUTPATIENT
Start: 2017-10-30 | End: 2018-10-30

## 2017-10-29 RX ORDER — IBUPROFEN 200 MG
1 TABLET ORAL DAILY PRN
Refills: 0 | COMMUNITY
Start: 2017-10-29

## 2017-10-29 RX ORDER — IBUPROFEN 200 MG
1 TABLET ORAL DAILY
COMMUNITY
Start: 2017-10-29

## 2017-10-29 RX ORDER — OLANZAPINE 15 MG/1
15 TABLET ORAL NIGHTLY
Qty: 30 TABLET | Refills: 0 | Status: SHIPPED | OUTPATIENT
Start: 2017-10-29 | End: 2018-10-29

## 2017-10-29 RX ADMIN — DIVALPROEX SODIUM 500 MG: 500 TABLET, EXTENDED RELEASE ORAL at 08:10

## 2017-10-29 RX ADMIN — THERA TABS 1 TABLET: TAB at 08:10

## 2017-10-29 RX ADMIN — OLANZAPINE 5 MG: 5 TABLET, ORALLY DISINTEGRATING ORAL at 08:10

## 2017-10-29 RX ADMIN — FOLIC ACID 1 MG: 1 TABLET ORAL at 08:10

## 2017-10-29 NOTE — NURSING
Patient discharged from [facility].  Patient will be transported via public transportation to [facility], at [address], [place]. Patient will receive all follow-up and smoking cessation at the [facility].  Patient is a smoker, uses up to 20 cigarettes daily.  AVS faxed to [facility] on Sunday, October 29, 2017 at 1000 hours.  Counseling done with patient, smoking cessation and instructions for medication usage included.  AVS reviewed and issued, copy of AVS faxed to [facility]. Patient's personal belongings issued to patient.

## 2017-10-30 NOTE — PSYCH
Patient will be following up with [facility] at [address] [place], [phone]  Appointment is on 11/7/17 at 12:30 pm.  Patient will receive tobacco cessation therapy appointment at mentioned appointment.  AVS and discharge summary faxed on 10/03/2017 at 9:15 am.
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
## 2023-09-13 NOTE — PLAN OF CARE
Problem: Patient Care Overview (Adult)  Goal: Plan of Care Review  Outcome: Ongoing (interventions implemented as appropriate)  Plan of care reviewed.  Denies intent to harm self or others at this time.  Accepts all meals and medications.  Gait steady, no falls seems to still pace some.  Remains intrusive but pleasant with his interactions with staff and peers. Will take redirections but needs reminding.  overall much calmer than from 10/25.  Promoted an individualized safety plan, reality-based interactions, effective coping strategies, and impulse control.  Will continue to monitor for safety.            Hydroxychloroquine Pregnancy And Lactation Text: This medication has been shown to cause fetal harm but it isn't assigned a Pregnancy Risk Category. There are small amounts excreted in breast milk.